# Patient Record
Sex: MALE | Race: AMERICAN INDIAN OR ALASKA NATIVE
[De-identification: names, ages, dates, MRNs, and addresses within clinical notes are randomized per-mention and may not be internally consistent; named-entity substitution may affect disease eponyms.]

---

## 2018-04-16 ENCOUNTER — HOSPITAL ENCOUNTER (EMERGENCY)
Dept: HOSPITAL 5 - ED | Age: 23
Discharge: HOME | End: 2018-04-16
Payer: SELF-PAY

## 2018-04-16 VITALS — SYSTOLIC BLOOD PRESSURE: 119 MMHG | DIASTOLIC BLOOD PRESSURE: 61 MMHG

## 2018-04-16 DIAGNOSIS — M54.5: Primary | ICD-10-CM

## 2018-04-16 PROCEDURE — 72100 X-RAY EXAM L-S SPINE 2/3 VWS: CPT

## 2018-04-16 NOTE — EMERGENCY DEPARTMENT REPORT
ED Back Pain/Injury HPI





- General


Chief Complaint: Back Pain/Injury


Stated Complaint: BACK PAIN POST FALL 2 DAYS AGO


Time Seen by Provider: 04/16/18 19:43


Source: patient


Limitations: No Limitations





- History of Present Illness


Initial Comments: 





Patient report back pain after playing soccer.  Denies fall or injury.  He said 

it started on Friday after he finished playing soccer and pain is increase in.  

Worse with movement and walking.  Pain is located to his mid lower back 

distally.  Pain is 9 out of 10 and aching.  He said he took Tylenol and it 

helped but pain starts again after Tylenol wears off.  Denies any numbness 

certainly to extremities.  Denies any urinary burning frequency or urgency.  

Denies any abdominal pain.  Denies any loss of bowel or bladder function.  

Denies any medical history.


MD Complaint: back pain


Onset/Timing: 3


-: days(s)


Similar Symptoms Previously: No


Place: street


Radiation: none


Severity: severe


Severity scale (0 -10): 9


Quality: aching


Consistency: constant


Improves With: other (rest)


Worsens With: movement, walking


Context: other (Playing soccer)


Associated Symptoms: denies other symptoms.  denies: confusion, weakness, chest 

pain, numbness, difficulty walking, cough, difficulty urinating, diaphoresis, 

incontinence, fever/chills, constipation, headaches, abdominal pain, loss of 

appetite, malaise, nausea/vomiting, rash, seizure, shortness of breath, syncope


Treatments Prior to Arrival: acetaminophen





- Related Data


 Previous Rx's











 Medication  Instructions  Recorded  Last Taken  Type


 


Ibuprofen [Motrin] 600 mg PO Q8H PRN #12 tablet 04/16/18 Unknown Rx











 Allergies











Allergy/AdvReac Type Severity Reaction Status Date / Time


 


No Known Allergies Allergy   Unverified 04/16/18 14:05














ED Review of Systems


ROS: 


Stated complaint: BACK PAIN POST FALL 2 DAYS AGO


Other details as noted in HPI





Comment: All other systems reviewed and negative


Constitutional: no symptoms reported


Respiratory: no symptoms reported


Cardiovascular: denies: chest pain, palpitations, dyspnea on exertion, edema, 

syncope, paroxysmal nocturnal dyspnea


Genitourinary: denies: urgency, dysuria, frequency, hematuria, discharge


Musculoskeletal: back pain.  denies: joint swelling, arthralgia, myalgia


Skin: denies: rash


Neurological: denies: headache, weakness, numbness, paresthesias, confusion, 

abnormal gait, vertigo





ED Past Medical Hx





- Past Medical History


Previous Medical History?: No





- Surgical History


Past Surgical History?: No





- Family History


Family history: no significant





- Social History


Smoking Status: Never Smoker


Substance Use Type: None





- Medications


Home Medications: 


 Home Medications











 Medication  Instructions  Recorded  Confirmed  Last Taken  Type


 


Ibuprofen [Motrin] 600 mg PO Q8H PRN #12 tablet 04/16/18  Unknown Rx














ED Physical Exam





- General


Limitations: No Limitations


General appearance: alert, in no apparent distress





- Head


Head exam: Present: atraumatic, normocephalic, normal inspection, other





- Eye


Eye exam: Present: normal appearance (normal exam), PERRL, EOMI.  Absent: 

nystagmus


Pupils: Present: normal accommodation





- ENT


ENT exam: Present: normal exam, normal orophraynx, mucous membranes moist





- Neck


Neck exam: Present: normal inspection, full ROM, other (no C-spine tenderness).

  Absent: tenderness, lymphadenopathy





- Respiratory


Respiratory exam: Present: normal lung sounds bilaterally.  Absent: respiratory 

distress, chest wall tenderness





- Cardiovascular


Cardiovascular Exam: Present: regular rate, normal rhythm, normal heart sounds





- GI/Abdominal


GI/Abdominal exam: Present: soft, normal bowel sounds.  Absent: distended, 

tenderness, guarding, rebound, rigid, organomegaly, mass, bruit, pulsatile mass

, hernia





- Extremities Exam


Extremities exam: Present: normal inspection, full ROM, normal capillary refill

, other (no clubbing, cyanosis or edema.  Positive pulses all extremities and 

no neurovascular compromise.).  Absent: tenderness, pedal edema, joint swelling

, calf tenderness





- Back Exam


Back exam: Present: normal inspection, full ROM, tenderness (L spine distally), 

vertebral tenderness (distally L spine), other (ambulates without any 

difficulties).  Absent: CVA tenderness (R), CVA tenderness (L), muscle spasm, 

paraspinal tenderness, rash noted





- Neurological Exam


Neurological exam: Present: alert, oriented X3, normal gait, reflexes normal.  

Absent: motor sensory deficit





- Expanded Neurological Exam


  ** Expanded


Neurological exam: Absent: innattentive, memory loss-remote event, memory loss-

recent event, ataxia, receptive aphasia, expressive aphasia, total aphasia, 

tremor, protecting the airway


Patient oriented to: Present: person, place, time


Speech: Present: fluid speech


Cranial nerves: EOM's Intact: Normal, Gag Reflex: Normal, Tongue Deviation: 

Normal, Nystagmus: Normal, Facial Sensation: Normal


Cerebellar function: Romberg: Normal


Upper motor neuron: Pronator Drift: Normal, Sensory Extinction: Normal


Sensory exam: Upper Extremity Light Touch: Normal, Upper Extremity Temperature: 

Normal, UE 2 Point Discrimination: Normal, Lower Extremity Light Touch: Normal, 

Lower Extremity Temperature: Normal, LE 2 Point Discrimination: Normal


Motor strength exam: RUE: 5, LUE: 5, RLE: 5, LLE: 5


DTR: bicep (R): 2+, bicep (L): 2+, tricep (R): 2+, tricep (L): 2+, knee (R): 2+

, knee (L): 2+, ankle (R): 2+, ankle (L): 2+


Best Eye Response (Hurricane): (4) open spontaneously


Best Motor Response (Lew): (6) obeys commands


Best Verbal Response (Hurricane): (5) oriented


Hurricane Total: 15





- Psychiatric


Psychiatric exam: Present: normal affect, normal mood





- Skin


Skin exam: Present: warm, dry, intact, normal color.  Absent: rash





ED Course


 Vital Signs











  04/16/18





  14:05


 


Temperature 98.3 F


 


Pulse Rate 77


 


Respiratory 16





Rate 


 


Blood Pressure 116/33


 


O2 Sat by Pulse 100





Oximetry 














- Reevaluation(s)


Reevaluation #1: 





04/16/18 21:41


Patient stable throughout ED course he was given Motrin 800 mg by mouth in 

emergency room for back pain which helped.





ED Medical Decision Making





- Radiology Data


Radiology results: report reviewed





X-ray of lumbar spine reveals negative lumbar spine series.  Incidental 

findings for partial incomplete closure posterior element S1





- Medical Decision Making





ED course: Is in a reports lower back pain after playing soccer 3 days ago.  He 

reports taking Tylenol without any complete relief.  Patient refers to pain as 

achy in and located in his lower back distally.  Physical findings are 

tenderness to palpate to lumbar spine distally.  Patient neurologically intact.

  I discussed the patient x-ray report which shows that negative lumbar spine 

series but patient did have incidental findings for partial incomplete closure 

posterior element SI .  I discussed with him if he continues to have back pain 

he needs to follow-up with orthopedic doctor.  He was given Motrin 800 mg by 

mouth and emergency room and discharged home in stable condition with 

prescription for Motrin


Critical care attestation.: 


If time is entered above; I have spent that time in minutes in the direct care 

of this critically ill patient, excluding procedure time.








ED Disposition


Clinical Impression: 


Lower back pain


Qualifiers:


 Chronicity: acute Back pain laterality: midline Sciatica presence: without 

sciatica Qualified Code(s): M54.5 - Low back pain





Disposition: DC-01 TO HOME OR SELFCARE


Is pt being admited?: No


Does the pt Need Aspirin: No


Condition: Stable


Instructions:  Acute Low Back Pain (ED)


Additional Instructions: 


Please refer to discharge instruction for referral to orthopedic doctor


[facility]


Take Motrin as prescribed for pain


Prescriptions: 


Ibuprofen [Motrin] 600 mg PO Q8H PRN #12 tablet


 PRN Reason: Pain


Referrals: 


DEWAYNE BAEZ MD [Staff Physician] - 2-3 Days


[facility] [Outside] - 2-3 Days


Forms:  Work/School Release Form(ED)

## 2018-04-16 NOTE — XRAY REPORT
FINAL REPORT



EXAM:  XR SPINE LUMBOSACRAL 2-3V



HISTORY:  back pain x 2 days 



TECHNIQUE:  Lumbar spine 4 views 



PRIORS:  None.



FINDINGS:  

Vertebral bodies demonstrate normal height and alignment. The

disc spaces are within normal limits.  There is no evidence of

spondylolisthesis. Transverse and spinous processes are intact SI

joints are unremarkable. Incidentally noted is partial incomplete

closure posterior elements S1. 



IMPRESSION:  

Negative lumbar spine series